# Patient Record
Sex: MALE | Race: WHITE | NOT HISPANIC OR LATINO | URBAN - METROPOLITAN AREA
[De-identification: names, ages, dates, MRNs, and addresses within clinical notes are randomized per-mention and may not be internally consistent; named-entity substitution may affect disease eponyms.]

---

## 2023-12-15 ENCOUNTER — CLAIMS CREATED FROM THE CLAIM WINDOW (OUTPATIENT)
Dept: URBAN - METROPOLITAN AREA MEDICAL CENTER 34 | Facility: MEDICAL CENTER | Age: 43
End: 2023-12-15
Payer: COMMERCIAL

## 2023-12-15 DIAGNOSIS — K92.1 ACUTE MELENA: ICD-10-CM

## 2023-12-15 DIAGNOSIS — K92.0 BLOODY EMESIS: ICD-10-CM

## 2023-12-15 DIAGNOSIS — K28.9 ANASTOMOTIC ULCER: ICD-10-CM

## 2023-12-15 DIAGNOSIS — K29.60 ADENOPAPILLOMATOSIS GASTRICA: ICD-10-CM

## 2023-12-15 DIAGNOSIS — I85.00 ESOPHAGEAL  VARICOSE VEINS: ICD-10-CM

## 2023-12-15 PROCEDURE — 99222 1ST HOSP IP/OBS MODERATE 55: CPT | Performed by: INTERNAL MEDICINE

## 2023-12-15 PROCEDURE — 43235 EGD DIAGNOSTIC BRUSH WASH: CPT | Performed by: INTERNAL MEDICINE

## 2023-12-15 PROCEDURE — G8427 DOCREV CUR MEDS BY ELIG CLIN: HCPCS | Performed by: INTERNAL MEDICINE

## 2023-12-15 PROCEDURE — 99254 IP/OBS CNSLTJ NEW/EST MOD 60: CPT | Performed by: INTERNAL MEDICINE

## 2023-12-22 ENCOUNTER — CLAIMS CREATED FROM THE CLAIM WINDOW (OUTPATIENT)
Dept: URBAN - METROPOLITAN AREA CLINIC 94 | Facility: CLINIC | Age: 43
End: 2023-12-22
Payer: COMMERCIAL

## 2023-12-22 ENCOUNTER — LAB OUTSIDE AN ENCOUNTER (OUTPATIENT)
Dept: URBAN - METROPOLITAN AREA CLINIC 94 | Facility: CLINIC | Age: 43
End: 2023-12-22

## 2023-12-22 VITALS
TEMPERATURE: 97.3 F | DIASTOLIC BLOOD PRESSURE: 68 MMHG | OXYGEN SATURATION: 90 % | BODY MASS INDEX: 26.55 KG/M2 | HEIGHT: 72 IN | SYSTOLIC BLOOD PRESSURE: 104 MMHG | HEART RATE: 71 BPM | WEIGHT: 196 LBS

## 2023-12-22 DIAGNOSIS — K28.9 ANASTOMOTIC ULCER: ICD-10-CM

## 2023-12-22 DIAGNOSIS — D50.9 IRON DEFICIENCY ANEMIA, UNSPECIFIED IRON DEFICIENCY ANEMIA TYPE: ICD-10-CM

## 2023-12-22 DIAGNOSIS — K62.5 RECTAL BLEEDING: ICD-10-CM

## 2023-12-22 DIAGNOSIS — D50.8 ACHLORHYDRIC ANEMIA: ICD-10-CM

## 2023-12-22 PROBLEM — 447408004: Status: ACTIVE | Noted: 2023-12-22

## 2023-12-22 PROBLEM — 87522002: Status: ACTIVE | Noted: 2023-12-22

## 2023-12-22 PROCEDURE — 99214 OFFICE O/P EST MOD 30 MIN: CPT | Performed by: PHYSICIAN ASSISTANT

## 2023-12-22 PROCEDURE — 99214 OFFICE O/P EST MOD 30 MIN: CPT | Performed by: INTERNAL MEDICINE

## 2023-12-22 RX ORDER — DAPAGLIFLOZIN 10 MG/1
1 TABLET TABLET, FILM COATED ORAL ONCE A DAY
Status: ACTIVE | COMMUNITY

## 2023-12-22 RX ORDER — DESVENLAFAXINE SUCCINATE 50 MG/1
1 TABLET TABLET, EXTENDED RELEASE ORAL ONCE A DAY
Status: ACTIVE | COMMUNITY

## 2023-12-22 RX ORDER — INSULIN ASPART 100 [IU]/ML
AS DIRECTED INJECTION, SOLUTION INTRAVENOUS; SUBCUTANEOUS
Status: ACTIVE | COMMUNITY

## 2023-12-22 RX ORDER — MONTELUKAST SODIUM 10 MG/1
1 TABLET TABLET, FILM COATED ORAL ONCE A DAY
Status: ACTIVE | COMMUNITY

## 2023-12-22 RX ORDER — FLUTICASONE PROPIONATE AND SALMETEROL XINAFOATE 230; 21 UG/1; UG/1
2 PUFFS AEROSOL, METERED RESPIRATORY (INHALATION) TWICE A DAY
Status: ACTIVE | COMMUNITY

## 2023-12-22 RX ORDER — PANCRELIPASE LIPASE, PANCRELIPASE PROTEASE, PANCRELIPASE AMYLASE 40000; 126000; 168000 [USP'U]/1; [USP'U]/1; [USP'U]/1
AS DIRECTED CAPSULE, DELAYED RELEASE ORAL
Status: ACTIVE | COMMUNITY

## 2023-12-22 RX ORDER — SOD SULF/POT CHLORIDE/MAG SULF 1.479 G
12 TABLETS THE FIRST DOSE THE EVENING BEFORE AND SECOND DOSE THE MORNING OF COLONOSCOPY TABLET ORAL TWICE A DAY
Qty: 24 | Refills: 0 | OUTPATIENT
Start: 2023-12-22 | End: 2023-12-23

## 2023-12-22 RX ORDER — NADOLOL 40 MG/1
1 TABLET TABLET ORAL ONCE A DAY
Status: ACTIVE | COMMUNITY

## 2023-12-22 RX ORDER — SUCRALFATE 1 G/1
1 TABLET ON AN EMPTY STOMACH TABLET ORAL TWICE A DAY
Status: ACTIVE | COMMUNITY

## 2023-12-22 NOTE — PHYSICAL EXAM GASTROINTESTINAL
Abdomen , soft, abdominal surgical scars, nontender, nondistended , no guarding or rigidity , no masses palpable , normal bowel sounds , Liver and Spleen , no hepatomegaly present , no hepatosplenomegaly , liver nontender , spleen not palpable

## 2023-12-22 NOTE — HPI-TODAY'S VISIT:
42 yo M evaluated today following recent hospitalization and rectal bleeding. PMH of total pancreatectomy, splenectomy, partial gastrectomy, CCY for neuroendocrine tumor of pancreas in 2017.   Pt presented to [facility] ER on 12/14 - due to rectal bleeding began a week ago and had first episode of hematemesis occurred the evening of ER visit. Notes he has had maroon colored blood in his stool with dark colored emesis  DR Sprague consulted. Pt states that he follows with Dr Barrett from [facility]. Last EGD from Dr Barrett 2/2022 reportedly showed normal esophagus without any varices, evidence of Billroth type II partial gastrectomy, normal anastomosis and normal jejunum. Gastric biopsies showed reactive gastropathy without H Pylori and jejunal biopsies were normal.   EGD 12/15/23 - Small grade I esophageal varices without stigmata of bleeding, too small to be banded- Evidence of Billroth type II gastrojejunostomy.- Gastritis with bile- Superficial 3 mm ulcer with clean white base at GJ anastomosis- Grossly normal jejunum in efferent and afferent loops Currently on Pantoprazole and Sucralfate. Discharged with Hgb 8  Since hospital discharge patient on 12/16 - still seeing dark blood with clot. Requesting colonoscopy.  Pt denies any further hematemsis since hospitalization.  Denies diarrhea but soft and actually had a solid formed stool with blood clot.   Colonoscopy - 2022 - nml TI, 4 mm Tubular Adenoma rectum, normal colon otherwise, Grade I IH Random Colon bx - reveals Lymphocytic Colitis - surveillance in 5 yrs   Patient continues to see Carlhem/onc with [facility] with CT or MRI every 6 mos

## 2023-12-23 LAB
ABSOLUTE BASOPHILS: 92
ABSOLUTE EOSINOPHILS: 246
ABSOLUTE LYMPHOCYTES: 1132
ABSOLUTE MONOCYTES: 785
ABSOLUTE NEUTROPHILS: 5444
BASOPHILS: 1.2
EOSINOPHILS: 3.2
HEMATOCRIT: 28.5
HEMOGLOBIN: 8.6
IRON BIND.CAP.(TIBC): 489
IRON SATURATION: 56
IRON: 276
LYMPHOCYTES: 14.7
MCH: 27.9
MCHC: 30.2
MCV: 92.5
MONOCYTES: 10.2
MPV: 12.5
NEUTROPHILS: 70.7
PLATELET COUNT: 619
RDW: 13
RED BLOOD CELL COUNT: 3.08
VITAMIN B12: 309
WHITE BLOOD CELL COUNT: 7.7

## 2023-12-25 ENCOUNTER — WEB ENCOUNTER (OUTPATIENT)
Dept: URBAN - METROPOLITAN AREA CLINIC 94 | Facility: CLINIC | Age: 43
End: 2023-12-25

## 2023-12-28 ENCOUNTER — TELEPHONE ENCOUNTER (OUTPATIENT)
Dept: URBAN - METROPOLITAN AREA CLINIC 94 | Facility: CLINIC | Age: 43
End: 2023-12-28

## 2023-12-28 ENCOUNTER — OFFICE VISIT (OUTPATIENT)
Dept: URBAN - METROPOLITAN AREA SURGERY CENTER 17 | Facility: SURGERY CENTER | Age: 43
End: 2023-12-28

## 2023-12-28 RX ORDER — PANTOPRAZOLE SODIUM 40 MG/1
1 TABLET TABLET, DELAYED RELEASE ORAL
Qty: 60 | Refills: 6 | OUTPATIENT
Start: 2023-12-28

## 2024-01-10 ENCOUNTER — DASHBOARD ENCOUNTERS (OUTPATIENT)
Age: 44
End: 2024-01-10

## 2024-01-10 ENCOUNTER — OFFICE VISIT (OUTPATIENT)
Dept: URBAN - METROPOLITAN AREA CLINIC 40 | Facility: CLINIC | Age: 44
End: 2024-01-10
Payer: COMMERCIAL

## 2024-01-10 ENCOUNTER — TELEPHONE ENCOUNTER (OUTPATIENT)
Dept: URBAN - METROPOLITAN AREA CLINIC 40 | Facility: CLINIC | Age: 44
End: 2024-01-10

## 2024-01-10 VITALS
HEIGHT: 72 IN | BODY MASS INDEX: 26.28 KG/M2 | WEIGHT: 194 LBS | SYSTOLIC BLOOD PRESSURE: 110 MMHG | DIASTOLIC BLOOD PRESSURE: 68 MMHG | TEMPERATURE: 97.7 F | HEART RATE: 65 BPM

## 2024-01-10 DIAGNOSIS — K64.9 BLEEDING HEMORRHOIDS: ICD-10-CM

## 2024-01-10 DIAGNOSIS — K62.5 RECTAL BLEEDING: ICD-10-CM

## 2024-01-10 DIAGNOSIS — D50.9 IRON DEFICIENCY ANEMIA, UNSPECIFIED IRON DEFICIENCY ANEMIA TYPE: ICD-10-CM

## 2024-01-10 DIAGNOSIS — K28.9 ANASTOMOTIC ULCER: ICD-10-CM

## 2024-01-10 PROCEDURE — 99213 OFFICE O/P EST LOW 20 MIN: CPT | Performed by: NURSE PRACTITIONER

## 2024-01-10 RX ORDER — INSULIN ASPART 100 [IU]/ML
AS DIRECTED INJECTION, SOLUTION INTRAVENOUS; SUBCUTANEOUS
Status: ACTIVE | COMMUNITY

## 2024-01-10 RX ORDER — PANTOPRAZOLE SODIUM 40 MG/1
1 TABLET TABLET, DELAYED RELEASE ORAL
Qty: 60 | Refills: 6 | Status: ACTIVE | COMMUNITY
Start: 2023-12-28

## 2024-01-10 RX ORDER — DESVENLAFAXINE SUCCINATE 50 MG/1
1 TABLET TABLET, EXTENDED RELEASE ORAL ONCE A DAY
Status: ACTIVE | COMMUNITY

## 2024-01-10 RX ORDER — FLUTICASONE PROPIONATE AND SALMETEROL XINAFOATE 230; 21 UG/1; UG/1
2 PUFFS AEROSOL, METERED RESPIRATORY (INHALATION) TWICE A DAY
Status: ACTIVE | COMMUNITY

## 2024-01-10 RX ORDER — PANCRELIPASE LIPASE, PANCRELIPASE PROTEASE, PANCRELIPASE AMYLASE 40000; 126000; 168000 [USP'U]/1; [USP'U]/1; [USP'U]/1
AS DIRECTED CAPSULE, DELAYED RELEASE ORAL
Status: ACTIVE | COMMUNITY

## 2024-01-10 RX ORDER — NADOLOL 40 MG/1
1 TABLET TABLET ORAL ONCE A DAY
Status: ACTIVE | COMMUNITY

## 2024-01-10 RX ORDER — DAPAGLIFLOZIN 10 MG/1
1 TABLET TABLET, FILM COATED ORAL ONCE A DAY
Status: ACTIVE | COMMUNITY

## 2024-01-10 RX ORDER — MONTELUKAST SODIUM 10 MG/1
1 TABLET TABLET, FILM COATED ORAL ONCE A DAY
Status: ACTIVE | COMMUNITY

## 2024-01-10 RX ORDER — SUCRALFATE 1 G/1
1 TABLET ON AN EMPTY STOMACH TABLET ORAL TWICE A DAY
Status: ACTIVE | COMMUNITY

## 2024-01-10 NOTE — PHYSICAL EXAM GASTROINTESTINAL
Abdomen , soft, nontender, nondistended , no guarding or rigidity , no masses palpable , normal bowel sounds , Liver and Spleen,  no hepatosplenomegaly , liver nontender, Rectal- large prolapsed hemorrhoid with clot noted, evidence of rectal bleeding noted , BRB smearing noted upon exam.

## 2024-01-10 NOTE — HPI-TODAY'S VISIT:
43-year-old male patient with past medical history as listed below new patient to this office.   Follows with the [place] office Janay Li, canceled his colonoscopy with Dr. Funez December 28, 2023.  Last seen December 22, 2023 by Janay Li PA-C.  Note her visit notes below.  Recent hospitalization and rectal bleeding. PMH of total pancreatectomy, splenectomy, partial gastrectomy, CCY for neuroendocrine tumor of pancreas in 2017.  Pt presented to [facility] ER on 12/14 - due to rectal bleeding began a week ago and had first episode of hematemesis occurred the evening of ER visit. Notes he has had maroon colored blood in his stool with dark colored emesis. Dr Sprague consulted. Pt states that he follows with Dr Barrett from [facility]. Last EGD from Dr Barrett 2/2022 reportedly showed normal esophagus without any varices, evidence of Billroth type II partial gastrectomy, normal anastomosis and normal jejunum. Gastric biopsies showed reactive gastropathy without H Pylori and jejunal biopsies were normal.  --EGD 12/15/23 - Small grade I esophageal varices without stigmata of bleeding, too small to be banded- Evidence of Billroth type II gastrojejunostomy.- Gastritis with bile- Superficial 3 mm ulcer with clean white base at GJ anastomosis- Grossly normal jejunum in efferent and afferent loops. Currently on Pantoprazole and Sucralfate. Discharged with Hgb 8. Since hospital discharge patient on 12/16 - still seeing dark blood with clot. Requesting colonoscopy. Pt denies any further hematemsis since hospitalization. Denies diarrhea but soft and actually had a solid formed stool with blood clot.   --Colonoscopy - 2022 - nml TI, 4 mm Tubular Adenoma rectum, normal colon otherwise, Grade I IH Random Colon bx - reveals Lymphocytic Colitis - surveillance in 5 yrs. Patient continues to see Carlhem/onc with [facility] with CT or MRI every 6 mos.  Patient was to continue PPI, still having rectal bleeding although no longer dark stools.  He would like colonoscopy.  Advised to go to ER if symptoms worsen, bleeding or fatigue, shortness of breath.  Labs were ordered, colonoscopy was ordered Sutab was sent for prep. -- Patient canceled his colonoscopy as he was in the hospital in [facility] at that time. --He called back December 28, 2023 stating he was in the hospital for bleeding ulcers, prescribed pantoprazole 40 mg twice daily, wife was requesting refill.  Janay Li replied prescription sent and to let patient know that [place] records were reviewed, he is supposed to be scheduling a follow-up with Dr. Solis as well.   -- 12/24/23-12/27/23 [facility], transferred from [place]. 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 CT abdomen pelvis reviewed with noted diffuse severe hepatic steatosis  12/24: s/p EGD - no active bleeding, friable gastric mucosa noted. 12/25: H/H stable. Non-bloody emesis. 12/26: Stop PPI and Octreotide drips. Possible DC home tomorrow if stable. 12/27: Hgb stable. Patient is stable for dc home with outpatient gi/hbs follow up.  -- The patient presents today as new patient for urgent visit. He has c/o rectal bleeding and grape sized hemorrhoid. Using panty liners for bleeding. He has been doing sitz bath. He lives in [place] and followed by [facility] and Hepatobiliary provider. Discussed with him we can not treat his hemorrhoids in office given his prior varices and hepatic history.  Recommend referral to colorectal surgeon for hemorrhoid treatment and to follow up with Dr. Solis as scheduled and his regular GI provider in [place]. He is to continue sucralfate and pantoprazole for ulcer, avoid NSAIDs.  Report to ED if profuse bleeding noted. His Mother is volunteer at [facility].

## 2024-01-17 ENCOUNTER — OFFICE VISIT (OUTPATIENT)
Dept: URBAN - METROPOLITAN AREA CLINIC 94 | Facility: CLINIC | Age: 44
End: 2024-01-17